# Patient Record
Sex: FEMALE | Race: WHITE
[De-identification: names, ages, dates, MRNs, and addresses within clinical notes are randomized per-mention and may not be internally consistent; named-entity substitution may affect disease eponyms.]

---
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
## 2022-11-13 ENCOUNTER — HOSPITAL ENCOUNTER (EMERGENCY)
Dept: HOSPITAL 33 - ED | Age: 22
Discharge: HOME | End: 2022-11-13
Payer: MEDICAID

## 2022-11-13 VITALS — DIASTOLIC BLOOD PRESSURE: 79 MMHG | HEART RATE: 78 BPM | SYSTOLIC BLOOD PRESSURE: 129 MMHG | OXYGEN SATURATION: 98 %

## 2022-11-13 DIAGNOSIS — Z28.310: ICD-10-CM

## 2022-11-13 DIAGNOSIS — K04.7: Primary | ICD-10-CM

## 2022-11-13 DIAGNOSIS — K08.89: ICD-10-CM

## 2022-11-13 PROCEDURE — 96372 THER/PROPH/DIAG INJ SC/IM: CPT

## 2022-11-13 PROCEDURE — 99283 EMERGENCY DEPT VISIT LOW MDM: CPT

## 2022-11-13 NOTE — ERPHSYRPT
- History of Present Illness


Time Seen by Provider: 11/13/22 11:42


Source: patient


Exam Limitations: no limitations


Patient Subjective Stated Complaint: pt here for toothache to upper right jaw,


Triage Nursing Assessment: pt alert, resp easy, face mask in place, skin w/d/p, 

pt has multi dcental caries


Physician History: 





pt here for toothache to upper right jaw, for few days.


pt has multi dcental caries 


Timing/Duration: day(s)


Severity: mild


Associated Symptoms: denies symptoms


Allergies/Adverse Reactions: 








No Known Drug Allergies Allergy (Verified 11/13/22 11:37)


   





Hx Tetanus, Diphtheria Vaccination/Date Given: Yes


Hx Influenza Vaccination/Date Given: No


Hx Pneumococcal Vaccination/Date Given: No


Immunizations Up to Date: Yes





Travel Risk





- International Travel


Have you traveled outside of the country in past 3 weeks: No





- Coronavirus Screening


Are you exhibiting any of the following symptoms?: No


Close contact with a COVID-19 positive Pt in past 14-21 Days: No





- Vaccine Status


Have you recieved a Covid-19 vaccination: No





- Review of Systems


Constitutional: No Symptoms


Eyes: No Symptoms


Ears, Nose, & Throat: Loose Teeth


Respiratory: No Symptoms


Cardiac: No Symptoms


Abdominal/Gastrointestinal: No Symptoms


Genitourinary Symptoms: No Symptoms


Musculoskeletal: No Symptoms





- Past Medical History


Pertinent Past Medical History: Yes


Psycho-Social History: Anxiety, Bipolar, Depression





- Past Surgical History


Past Surgical History: No





- Social History


Smoking Status: Former smoker


Exposure to second hand smoke: No


Drug Use: none


Patient Lives Alone: No





- Female History


Hx Last Menstrual Period: now


Hx Pregnant Now: No





- Nursing Vital Signs


Nursing Vital Signs: 





                               Initial Vital Signs











Temperature  97.6 F   11/13/22 11:38


 


Pulse Rate  78   11/13/22 11:38


 


Respiratory Rate  18   11/13/22 11:38


 


Blood Pressure  129/79   11/13/22 11:38


 


O2 Sat by Pulse Oximetry  98   11/13/22 11:38








                                   Pain Scale











Pain Intensity                 0

















- Physical Exam


General Appearance: no apparent distress


Eye Exam: PERRL/EOMI


Ears, Nose, Throat Exam: other (multiple teeth decay)


Neck Exam: normal inspection


Respiratory Exam: normal breath sounds


Cardiovascular Exam: regular rate/rhythm


Gastrointestinal/Abdomen Exam: soft


Back Exam: normal inspection


Extremity Exam: normal inspection


Neurologic Exam: alert, oriented x 3


SpO2: 98





- Course


Nursing assessment & vital signs reviewed: Yes





- Progress


Progress: improved, pain not gone completely





- Departure


Departure Disposition: Home


Clinical Impression: 


 Abscessed tooth





Condition: Stable


Critical Care Time: No


Referrals: 


DOCTOR,NO FAMILY [Primary Care Provider] - Follow up/PCP as directed


Instructions:  Tooth Abscess (DC), Tooth Decay, Adult (DC), Dental Pain (DC)


Additional Instructions: 


Discharge/Care Plan





ROME BRAN was seen on 11/13/22 in the Emergency Room. The patient was 

counseled regarding Diagnosis,Lab results, Imaging studies, need for follow up 

and when to return to the Emergency Room.





Prescriptions given:





Discharge Note





I have spoken with the patient and/or caregivers. I have explained the patient's

condition, diagnosis and treatment plan based on the information available to me

at this time. I have answered the patient's and/or caregiver's questions and 

addressed any concerns. The patient and/or caregivers have as good understanding

of the patient's diagnosis, condition and treatment plan as can be expected at 

this point. The vital signs have been stable. The patient's condition is stable 

and appropriate for discharge from the emergency department.





The patient will pursue further outpatient evaluation with the primary care 

physician or other designated or consulting physician as outlined in the 

discharge instructions. The patient and/or caregivers are agreeable to this plan

of care and follow-up instructions have been explained in detail. The patient 

and/or caregivers have received these instruction. The patient/and or caregivers

are aware that any significant change in condition or worsening of symptoms 

should prompt an immediate return to this or the closest emergency department or

call 911. 








Prescriptions: 


Amoxicillin 500 mg Cap*** [Amoxil 500 mg***] 500 mg PO TID #30 cap


Naproxen 375 mg*** [Naprosyn 375 mg***] 375 mg PO Q8H #30 tablet

## 2023-03-03 ENCOUNTER — HOSPITAL ENCOUNTER (EMERGENCY)
Dept: HOSPITAL 33 - ED | Age: 23
Discharge: HOME | End: 2023-03-03
Payer: COMMERCIAL

## 2023-03-03 VITALS — OXYGEN SATURATION: 98 % | DIASTOLIC BLOOD PRESSURE: 78 MMHG | HEART RATE: 112 BPM | SYSTOLIC BLOOD PRESSURE: 134 MMHG

## 2023-03-03 DIAGNOSIS — Z28.310: ICD-10-CM

## 2023-03-03 DIAGNOSIS — Z34.01: Primary | ICD-10-CM

## 2023-03-03 DIAGNOSIS — Z72.0: ICD-10-CM

## 2023-03-03 DIAGNOSIS — R10.2: ICD-10-CM

## 2023-03-03 LAB
BACTERIA UR CULT: NO
RBC # URNS HPF: (no result) /HPF (ref 0–5)
WBC URNS QL MICRO: (no result) /HPF (ref 0–5)

## 2023-03-03 PROCEDURE — 36415 COLL VENOUS BLD VENIPUNCTURE: CPT

## 2023-03-03 PROCEDURE — 99283 EMERGENCY DEPT VISIT LOW MDM: CPT

## 2023-03-03 PROCEDURE — 84702 CHORIONIC GONADOTROPIN TEST: CPT

## 2023-03-03 PROCEDURE — 76801 OB US < 14 WKS SINGLE FETUS: CPT

## 2023-03-03 PROCEDURE — 81001 URINALYSIS AUTO W/SCOPE: CPT

## 2023-03-03 PROCEDURE — 84703 CHORIONIC GONADOTROPIN ASSAY: CPT

## 2023-03-03 NOTE — XRAY
Indication: Cramping.  Positive pregnancy test.



2-dimensional transvaginal early OB ultrasound performed.



Comparison: None



Single intrauterine gestational sac, fetal pole, and yolk sac.  Mean

crown-rump length measures 0.24 cm corresponding to 5 weeks 5 days.  No fetal

heart tones detected, possible early pregnancy.  Tiny 1 x 0.3 cm subchorionic

hemorrhage.



Impression: Single intrauterine pregnancy measuring 5 weeks 5 days.  No fetal

heart tones, presumed early pregnancy.  Tiny subchorionic hemorrhage.

Correlate with serial beta-hCG and follow-up sonogram regarding pregnancy

viability.



Comment: Preliminary report was given.

## 2023-03-03 NOTE — ERPHSYRPT
- History of Present Illness


Source: patient


Exam Limitations: no limitations


Patient Subjective Stated Complaint: pt alert, resp easy, skin w/d/p.moves all 

ext.well.


Triage Nursing Assessment: pt here for lower abd cramping off and on, she states

pain is gone now, no vaginal bleeding,


Physician History: 





21 yo WF w LMP on 25 presents w intermittent pelvic cramping x 2 wks. Pt 

has no cramping at the present. She had a +home pregnancy test. Pt denies vag 

bleeding/vag dc/dysuria/hematuria/fever. [id number] if pregnant today.


Timing/Duration: other (2 wks intermittently)


Activites at Onset: rest


Quality: cramping


Onset Location: pelvic pain


Pain Radiation: none


Severity of Pain-Max: moderate


Severity of Pain-Current: none


Prior abdominal problems: none


Sexual intercourse history: other (States pregnant )


Modifying Factors: Improves With: nothing


Associated Symptoms: denies symptoms


Allergies/Adverse Reactions: 








No Known Drug Allergies Allergy (Verified 22 11:37)


   





Hx Tetanus, Diphtheria Vaccination/Date Given: Yes


Hx Influenza Vaccination/Date Given: No


Hx Pneumococcal Vaccination/Date Given: No





Travel Risk





- International Travel


Have you traveled outside of the country in past 3 weeks: No





- Coronavirus Screening


Are you exhibiting any of the following symptoms?: No


Close contact with a COVID-19 positive Pt in past 14-21 Days: No





- Vaccine Status


Have you recieved a Covid-19 vaccination: No





- Review of Systems


Constitutional: No Symptoms


Eyes: No Symptoms


Ears, Nose, & Throat: No Symptoms


Respiratory: No Symptoms


Cardiac: No Symptoms


Abdominal/Gastrointestinal: No Symptoms


Genitourinary Symptoms: Pregnancy


Musculoskeletal: No Symptoms


Skin: No Symptoms


Neurological: No Symptoms


Psychological: No Symptoms


Endocrine: No Symptoms


Hematologic/Lymphatic: No Symptoms


Immunological/Allergic: No Symptoms





- Past Medical History


Pertinent Past Medical History: Yes


Psycho-Social History: Anxiety, Bipolar, Depression





- Past Surgical History


Past Surgical History: No





- Social History


Smoking Status: Current every day smoker


Exposure to second hand smoke: No


Drug Use: none


Patient Lives Alone: No





- Female History


Hx Last Menstrual Period: 2023


Hx Pregnant Now: Yes


Gestational Age: ?





- Nursing Vital Signs


Nursing Vital Signs: 


                               Initial Vital Signs











Temperature  97.4 F   23 15:31


 


Pulse Rate  112 H  23 15:31


 


Respiratory Rate  18   23 15:31


 


Blood Pressure  134/78   23 15:31


 


O2 Sat by Pulse Oximetry  98   23 15:31








                                   Pain Scale











Pain Intensity                 0











Tachy





- Physical Exam


General Appearance: no apparent distress


Eye Exam: PERRL/EOMI, eyes nml inspection


Ears, Nose, Throat Exam: normal ENT inspection, TMs normal, pharynx normal, 

moist mucous membranes


Neck Exam: normal inspection, non-tender, supple, full range of motion, No 

meningismus, No mass, No Brudzinski, No Kernig's, No carotid bruit


Respiratory Exam: normal breath sounds, lungs clear, airway intact


Cardiovascular Exam: tachycardia, capillary refill <2 sec, No murmur


Gastrointestinal/Abdomen Exam: soft, normal bowel sounds, No tenderness


Back Exam: normal inspection, normal range of motion, No CVA tenderness, No 

vertebral tenderness


Extremity Exam: normal inspection, normal range of motion


Neurologic Exam: alert, oriented x 3, cooperative, CNs II-XII nml as tested, 

normal mood/affect, nml cerebellar function, nml station & gait, sensation nml


Skin Exam: normal color, warm, dry


Lymphatic Exam: No adenopathy


**SpO2 Interpretation**: normal


SpO2: 98


O2 Delivery: Room Air





- Course


Nursing assessment & vital signs reviewed: Yes





- Radiology Ultrasound Exam


  ** Pelvis


Ultrasound: IUP, Other (Per tech/small sub-chorionic hemorrhage)


Ordered Tests: 


                               Active Orders 24 hr











 Category Date Time Status


 


 OB <14 WKS 1ST GESTATION [US] Stat Exams  23 16:40 Completed


 


 HCG QUALITATIVE,SERUM Stat Lab  23 16:18 Completed


 


 HCG, Quantitative (Inhouse) Stat Lab  23 16:44 Received


 


 UA W/RFX UR CULTURE Stat Lab  23 16:45 Completed











Lab/Rad Data: 


                               Laboratory Results











  23 Range/Units





  16:45 16:18 


 


Serum Pregnancy, Qual   POSITIVE  (Negative)  


 


Urine Color  Yellow   (Yellow)  


 


Urine Appearance  Clear   (Clear)  


 


Urine pH  7.0   (4.6-8.0)  


 


Ur Specific Gravity  1.015   (1.005-1.030)  


 


Urine Protein  Negative   (Negative)  


 


Urine Glucose (UA)  Negative   (Negative)  mg/dL


 


Urine Ketones  Negative   (Negative)  


 


Urine Blood  Negative   (Negative)  


 


Urine Nitrite  Negative   (Negative)  


 


Urine Bilirubin  Negative   (Negative)  


 


Urine Urobilinogen  1.0 A   (0.2)  mg/dL


 


Ur Leukocyte Esterase  Negative   (Negative)  


 


U Hyaline Cast (Auto)  NONE SEEN   (0-2)  /LPF


 


Urine Microscopic RBC  0-2   (0-5)  /HPF


 


Urine Microscopic WBC  0-2   (0-5)  /HPF


 


Ur Epithelial Cells  Rare   (None Seen)  /HPF


 


Urine Bacteria  None Seen   (None Seen)  /HPF


 


Urine Culture Reflexed  NO   (NO)  














- Progress


Progress Note: 





23 17:43


Nursing note and vital signs reviewed


No food or housing insecurities noted


US and lab results reviewed and shared w pt


No pelvic pain in ER


23 18:04





Counseled pt/family regarding: lab results, diagnosis, need for follow-up, rad 

results





- Departure


Departure Disposition: Home


Clinical Impression: 


 Intrauterine pregnancy





Condition: Stable


Critical Care Time: No


Referrals: 


SHELLY LOWERY MD [Primary Care Provider] - Follow up/PCP as directed


Instructions:  Pregnancy Symptoms


Additional Instructions: 


Follow up with your Ob-gyn


Prenatal vitals


Return to ER as needed